# Patient Record
Sex: FEMALE | Race: OTHER | NOT HISPANIC OR LATINO | URBAN - METROPOLITAN AREA
[De-identification: names, ages, dates, MRNs, and addresses within clinical notes are randomized per-mention and may not be internally consistent; named-entity substitution may affect disease eponyms.]

---

## 2024-11-23 ENCOUNTER — HOSPITAL ENCOUNTER (EMERGENCY)
Facility: HOSPITAL | Age: 59
Discharge: HOME | End: 2024-11-23
Attending: EMERGENCY MEDICINE
Payer: COMMERCIAL

## 2024-11-23 VITALS
DIASTOLIC BLOOD PRESSURE: 103 MMHG | RESPIRATION RATE: 20 BRPM | SYSTOLIC BLOOD PRESSURE: 179 MMHG | BODY MASS INDEX: 29.02 KG/M2 | HEIGHT: 64 IN | OXYGEN SATURATION: 100 % | WEIGHT: 170 LBS | TEMPERATURE: 98 F | HEART RATE: 78 BPM

## 2024-11-23 DIAGNOSIS — M54.41 CHRONIC BILATERAL LOW BACK PAIN WITH RIGHT-SIDED SCIATICA: Primary | ICD-10-CM

## 2024-11-23 DIAGNOSIS — G89.29 CHRONIC BILATERAL LOW BACK PAIN WITH RIGHT-SIDED SCIATICA: Primary | ICD-10-CM

## 2024-11-23 LAB
ANION GAP SERPL CALC-SCNC: 8 MMOL/L (ref 8–16)
BACTERIA #/AREA URNS AUTO: ABNORMAL /HPF
BASOPHILS # BLD AUTO: 0.01 K/UL (ref 0–0.2)
BASOPHILS NFR BLD: 0.3 % (ref 0–1.9)
BILIRUB UR QL STRIP: NEGATIVE
BUN SERPL-MCNC: 11 MG/DL (ref 6–20)
CALCIUM SERPL-MCNC: 9.6 MG/DL (ref 8.7–10.5)
CHLORIDE SERPL-SCNC: 109 MMOL/L (ref 95–110)
CLARITY UR REFRACT.AUTO: CLEAR
CO2 SERPL-SCNC: 24 MMOL/L (ref 23–29)
COLOR UR AUTO: ABNORMAL
CREAT SERPL-MCNC: 0.8 MG/DL (ref 0.5–1.4)
DIFFERENTIAL METHOD BLD: ABNORMAL
EOSINOPHIL # BLD AUTO: 0.1 K/UL (ref 0–0.5)
EOSINOPHIL NFR BLD: 3 % (ref 0–8)
ERYTHROCYTE [DISTWIDTH] IN BLOOD BY AUTOMATED COUNT: 15.4 % (ref 11.5–14.5)
EST. GFR  (NO RACE VARIABLE): >60 ML/MIN/1.73 M^2
GLUCOSE SERPL-MCNC: 96 MG/DL (ref 70–110)
GLUCOSE UR QL STRIP: NEGATIVE
HCT VFR BLD AUTO: 40.5 % (ref 37–48.5)
HCV AB SERPL QL IA: NORMAL
HGB BLD-MCNC: 12.9 G/DL (ref 12–16)
HGB UR QL STRIP: NEGATIVE
HIV 1+2 AB+HIV1 P24 AG SERPL QL IA: NORMAL
IMM GRANULOCYTES # BLD AUTO: 0.01 K/UL (ref 0–0.04)
IMM GRANULOCYTES NFR BLD AUTO: 0.3 % (ref 0–0.5)
KETONES UR QL STRIP: NEGATIVE
LEUKOCYTE ESTERASE UR QL STRIP: NEGATIVE
LYMPHOCYTES # BLD AUTO: 1.7 K/UL (ref 1–4.8)
LYMPHOCYTES NFR BLD: 43.3 % (ref 18–48)
MCH RBC QN AUTO: 24.8 PG (ref 27–31)
MCHC RBC AUTO-ENTMCNC: 31.9 G/DL (ref 32–36)
MCV RBC AUTO: 78 FL (ref 82–98)
MICROSCOPIC COMMENT: ABNORMAL
MONOCYTES # BLD AUTO: 0.3 K/UL (ref 0.3–1)
MONOCYTES NFR BLD: 8.1 % (ref 4–15)
NEUTROPHILS # BLD AUTO: 1.8 K/UL (ref 1.8–7.7)
NEUTROPHILS NFR BLD: 45 % (ref 38–73)
NITRITE UR QL STRIP: POSITIVE
NRBC BLD-RTO: 0 /100 WBC
PH UR STRIP: 7 [PH] (ref 5–8)
PLATELET # BLD AUTO: 327 K/UL (ref 150–450)
PMV BLD AUTO: 9.7 FL (ref 9.2–12.9)
POTASSIUM SERPL-SCNC: 3.8 MMOL/L (ref 3.5–5.1)
PROT UR QL STRIP: NEGATIVE
RBC # BLD AUTO: 5.21 M/UL (ref 4–5.4)
RBC #/AREA URNS AUTO: 1 /HPF (ref 0–4)
SODIUM SERPL-SCNC: 141 MMOL/L (ref 136–145)
SP GR UR STRIP: 1.01 (ref 1–1.03)
SQUAMOUS #/AREA URNS AUTO: 0 /HPF
URN SPEC COLLECT METH UR: ABNORMAL
WBC # BLD AUTO: 3.95 K/UL (ref 3.9–12.7)
WBC #/AREA URNS AUTO: 1 /HPF (ref 0–5)

## 2024-11-23 PROCEDURE — 96374 THER/PROPH/DIAG INJ IV PUSH: CPT

## 2024-11-23 PROCEDURE — 86803 HEPATITIS C AB TEST: CPT | Performed by: PHYSICIAN ASSISTANT

## 2024-11-23 PROCEDURE — 80048 BASIC METABOLIC PNL TOTAL CA: CPT | Performed by: PHYSICIAN ASSISTANT

## 2024-11-23 PROCEDURE — 87389 HIV-1 AG W/HIV-1&-2 AB AG IA: CPT | Performed by: PHYSICIAN ASSISTANT

## 2024-11-23 PROCEDURE — 99284 EMERGENCY DEPT VISIT MOD MDM: CPT | Mod: 25

## 2024-11-23 PROCEDURE — 85025 COMPLETE CBC W/AUTO DIFF WBC: CPT | Performed by: PHYSICIAN ASSISTANT

## 2024-11-23 PROCEDURE — 81001 URINALYSIS AUTO W/SCOPE: CPT | Performed by: PHYSICIAN ASSISTANT

## 2024-11-23 PROCEDURE — 25000003 PHARM REV CODE 250: Performed by: PHYSICIAN ASSISTANT

## 2024-11-23 PROCEDURE — 63600175 PHARM REV CODE 636 W HCPCS: Performed by: PHYSICIAN ASSISTANT

## 2024-11-23 RX ORDER — METHOCARBAMOL 750 MG/1
1500 TABLET, FILM COATED ORAL
Status: COMPLETED | OUTPATIENT
Start: 2024-11-23 | End: 2024-11-23

## 2024-11-23 RX ORDER — IBUPROFEN 800 MG/1
800 TABLET ORAL EVERY 6 HOURS PRN
Qty: 20 TABLET | Refills: 0 | Status: SHIPPED | OUTPATIENT
Start: 2024-11-23

## 2024-11-23 RX ORDER — LIDOCAINE 50 MG/G
1 PATCH TOPICAL
Status: DISCONTINUED | OUTPATIENT
Start: 2024-11-23 | End: 2024-11-23 | Stop reason: HOSPADM

## 2024-11-23 RX ORDER — KETOROLAC TROMETHAMINE 30 MG/ML
10 INJECTION, SOLUTION INTRAMUSCULAR; INTRAVENOUS
Status: COMPLETED | OUTPATIENT
Start: 2024-11-23 | End: 2024-11-23

## 2024-11-23 RX ORDER — METHOCARBAMOL 750 MG/1
1500 TABLET, FILM COATED ORAL EVERY 8 HOURS PRN
Qty: 12 TABLET | Refills: 0 | Status: SHIPPED | OUTPATIENT
Start: 2024-11-23

## 2024-11-23 RX ORDER — AMLODIPINE BESYLATE 5 MG/1
5 TABLET ORAL DAILY
COMMUNITY

## 2024-11-23 RX ADMIN — LIDOCAINE 1 PATCH: 50 PATCH TOPICAL at 08:11

## 2024-11-23 RX ADMIN — KETOROLAC TROMETHAMINE 10 MG: 30 INJECTION, SOLUTION INTRAMUSCULAR; INTRAVENOUS at 08:11

## 2024-11-23 RX ADMIN — METHOCARBAMOL TABLETS 1500 MG: 750 TABLET, COATED ORAL at 08:11

## 2024-11-23 NOTE — ED NOTES
Patient identifiers verified and correct for Solitario BlackBrinson  LOC: The patient is awake, alert and aware of environment with an appropriate affect, the patient is oriented x 3 and speaking appropriately.   APPEARANCE: Patient appears uncomfortable but in no acute distress, patient is clean and well groomed.  SKIN: The skin is warm and dry, color consistent with ethnicity, patient has normal skin turgor and moist mucus membranes, skin intact, no breakdown or bruising noted.   MUSCULOSKELETAL: Patient moving all extremities spontaneously, no swelling noted. Pt reports lower back pain.  RESPIRATORY: Airway is open and patent, respirations are spontaneous, patient has a normal effort and rate, no accessory muscle use noted, O2 sats noted at 100% on room air.  CARDIAC: Denies chest pain HR 78  GASTRO: Denies abdominal pain nausea or emesis  : Pt denies any pain or frequency but does endorse strong odor with urination.  NEURO: AAOX4 Speech clear. Denies numbness or tingling to extremities

## 2024-11-23 NOTE — ED TRIAGE NOTES
"Pt reports bilateral lower back pain radiating to BLE for multiple months. Pt also reports strong odor from urine that's been "off and on for about a year"  "

## 2024-11-23 NOTE — DISCHARGE INSTRUCTIONS
Take ibuprofen 800 mg every 6 hours as needed with food for anti-inflammatory relief.  You can take acetaminophen/tylenol 650 mg every 6 hours or 1000 mg every 8 hours for added relief.  Take muscle relaxer Robaxin every 8 hours as needed for muscle spasms. Do not use medication when you are operating heavy machinery, driving, or working because the medication may be sedating. Do not take medication sooner than the frequency as directed.   Apply ice to the area for 10-20 minutes every 4 hours. You can apply heat 2 days after for the same duration and frequency.  Follow up with primary care, back and spine, and physical therapy. Referrals placed.  Return to the ER for new or worsening symptoms.

## 2024-11-23 NOTE — ED PROVIDER NOTES
Encounter Date: 11/23/2024       History     Chief Complaint   Patient presents with    Back Pain     Intermittent for 6 months Patient states experiencing muscle spasms. Patient also endorses foul smelling urine     7:58 AM  Patient is a 59-year-old female with a history of HTN who presents to [facility] ED for emergent evaluation of multiple complaints.    Patient endorses intermittent low back pain for about 6 months.  She states her back pain sometimes is localized to 1 side and sometimes it is bilateral.  This time in his located to her right low back.  She states in October she had an episode where her pain was so severe it caused her to have leg pain and weakness, but she currently does not have that that.  She denies any bowel or bladder incontinence, saddle anesthesias, or lower extremity numbness or tingling.    She also endorses intermittent malodorous urine for 1 year.  It started again on Tuesday.  Has not been treated for UTI in a long time.  She has frequency.  Denies any dysuria or hematuria.    Last had Tylenol yesterday p.m..  States her Tylenol stopped working which prompted her to come to the ED for her back pain.  Denies any previous surgeries to her back. No new injury or trauma.    Have an appt with her PCP on 12/9/2024.         Review of patient's allergies indicates:  No Known Allergies  Past Medical History:   Diagnosis Date    Hypertension      History reviewed. No pertinent surgical history.  No family history on file.  Social History     Tobacco Use    Smoking status: Never    Smokeless tobacco: Never   Substance Use Topics    Alcohol use: Not Currently    Drug use: Never     Review of Systems   Constitutional:  Positive for activity change. Negative for appetite change and fever.   HENT:  Negative for sore throat.    Respiratory:  Negative for shortness of breath.    Cardiovascular:  Negative for chest pain.   Gastrointestinal:  Negative for blood in stool, constipation, diarrhea, nausea and  vomiting.   Genitourinary:  Positive for frequency. Negative for difficulty urinating and dysuria.        +malodorous urine   Musculoskeletal:  Positive for back pain and myalgias.   Skin:  Negative for rash.   Neurological:  Negative for weakness and numbness.   Hematological:  Does not bruise/bleed easily.       Physical Exam     Initial Vitals [11/23/24 0732]   BP Pulse Resp Temp SpO2   (!) 179/103 78 20 97.8 °F (36.6 °C) 100 %      MAP       --         Physical Exam    Vitals reviewed.  Constitutional: She appears well-developed and well-nourished. She is not diaphoretic. She is cooperative.  Non-toxic appearance. She does not have a sickly appearance. She does not appear ill. No distress.   HENT:   Head: Normocephalic and atraumatic.   Nose: Nose normal. Mouth/Throat: No trismus in the jaw.   Eyes: Conjunctivae and EOM are normal.   Neck:   Normal range of motion.  Pulmonary/Chest: No accessory muscle usage. No tachypnea. No respiratory distress.   Abdominal: She exhibits no distension.   Musculoskeletal:         General: Normal range of motion.      Cervical back: Normal range of motion.     Neurological: She is alert. She has normal strength.   Reflex Scores:       Tricep reflexes are 2+ on the right side and 2+ on the left side.       Bicep reflexes are 2+ on the right side and 2+ on the left side.       Brachioradialis reflexes are 2+ on the right side and 2+ on the left side.       Patellar reflexes are 2+ on the right side and 2+ on the left side.       Achilles reflexes are 2+ on the right side and 2+ on the left side.  Full range of motion and 5/5 strength to bilateral upper and lower extremities.  Sensations grossly intact.  She can bear weight and ambulate independently, but favors to flex at the trunk secondary to pain if standing straight.   Skin: Skin is warm and dry. No erythema. No pallor.         ED Course   Procedures  Labs Reviewed   URINALYSIS, REFLEX TO URINE CULTURE - Abnormal       Result  Value    Specimen UA Urine, Clean Catch      Color, UA Straw      Appearance, UA Clear      pH, UA 7.0      Specific Gravity, UA 1.010      Protein, UA Negative      Glucose, UA Negative      Ketones, UA Negative      Bilirubin (UA) Negative      Occult Blood UA Negative      Nitrite, UA Positive (*)     Leukocytes, UA Negative      Narrative:     Specimen Source->Urine   CBC W/ AUTO DIFFERENTIAL - Abnormal    WBC 3.95      RBC 5.21      Hemoglobin 12.9      Hematocrit 40.5      MCV 78 (*)     MCH 24.8 (*)     MCHC 31.9 (*)     RDW 15.4 (*)     Platelets 327      MPV 9.7      Immature Granulocytes 0.3      Gran # (ANC) 1.8      Immature Grans (Abs) 0.01      Lymph # 1.7      Mono # 0.3      Eos # 0.1      Baso # 0.01      nRBC 0      Gran % 45.0      Lymph % 43.3      Mono % 8.1      Eosinophil % 3.0      Basophil % 0.3      Differential Method Automated      Narrative:     Release to patient->Immediate   URINALYSIS MICROSCOPIC - Abnormal    RBC, UA 1      WBC, UA 1      Bacteria Few (*)     Squam Epithel, UA 0      Microscopic Comment SEE COMMENT      Narrative:     Specimen Source->Urine   HEPATITIS C ANTIBODY    Hepatitis C Ab Non-reactive      Narrative:     Release to patient->Immediate   HIV 1 / 2 ANTIBODY    HIV 1/2 Ag/Ab Non-reactive      Narrative:     Release to patient->Immediate   BASIC METABOLIC PANEL    Sodium 141      Potassium 3.8      Chloride 109      CO2 24      Glucose 96      BUN 11      Creatinine 0.8      Calcium 9.6      Anion Gap 8      eGFR >60.0      Narrative:     Release to patient->Immediate          Imaging Results    None          Medications   LIDOcaine 5 % patch 1 patch (1 patch Transdermal Patch Applied 11/23/24 0820)   ketorolac injection 9.999 mg (9.999 mg Intravenous Given 11/23/24 0820)   methocarbamoL tablet 1,500 mg (1,500 mg Oral Given 11/23/24 0821)     Medical Decision Making  Differential diagnosis includes but isn't limited to strain, sprain, sacroiliitis, DDD, DJD,  other inflammatory arthritis.  No injury or trauma.  No bony tenderness.  No red flag symptoms.  No procedures.  No systemic symptoms.  Vitals are stable and she is nontoxic appearing.  Doubt spine fractures, dislocations, infection such as diskitis or epidural abscess, cauda equina, or spinal cord compression.    We can check labs since I have no records on her, check UA, give medication for symptomatic relief, and reassess.    Amount and/or Complexity of Data Reviewed  Labs: ordered.               ED Course as of 11/23/24 0935   Sat Nov 23, 2024   0758 BP(!): 179/103 [CL]   0758 Temp: 97.8 °F (36.6 °C) [CL]   0758 Pulse: 78 [CL]   0758 Resp: 20 [CL]   0758 SpO2: 100 % [CL]   0820 WBC: 3.95 [CL]   0820 Hemoglobin: 12.9 [CL]   0820 Platelet Count: 327 [CL]   0828 Sodium: 141 [CL]   0828 Potassium: 3.8 [CL]   0828 Glucose: 96 [CL]   0828 Creatinine: 0.8  No SCOTT. [CL]   0857 RBC, UA: 1 [CL]   0857 WBC, UA: 1 [CL]   0857 Bacteria, UA(!): Few  No signs of UTI. [CL]      ED Course User Index  [CL] Emily Ford PA-C            Patient reassessed.  She reports improvement in her pain while here.  Updated with labs and urinalysis results which do not show any acute findings.  I will treat her for musculoskeletal pain.  I think today she has sacroiliitis given that her pain is over her right SI joint, but it sounds like she likely has degenerative disc disease of lumbar region.  She has radicular pain today.  Will treat conservatively.  Continue NSAIDs, Tylenol.  Muscle relaxers for tension only as advised.  Follow up closely with back and spine for chronic pain.  I also would like her to follow up with physical therapy.  Referral placed.  All of her questions were answered.  Patient comfortable with plan and stable for discharge.                 Clinical Impression:  Final diagnoses:  [M54.41, G89.29] Chronic bilateral low back pain with right-sided sciatica (Primary)          ED Disposition Condition    Discharge Stable           ED Prescriptions       Medication Sig Dispense Start Date End Date Auth. Provider    methocarbamoL (ROBAXIN) 750 MG Tab Take 2 tablets (1,500 mg total) by mouth every 8 (eight) hours as needed. 12 tablet 11/23/2024 -- Emily Ford PA-C    ibuprofen (ADVIL,MOTRIN) 800 MG tablet Take 1 tablet (800 mg total) by mouth every 6 (six) hours as needed for Pain. 20 tablet 11/23/2024 -- Emily Ford PA-C          Follow-up Information       Follow up With Specialties Details Why Contact Info Additional Information    [facility] Schedule an appointment as soon as possible for a visit   [address], [address]  [facility] [address] [phone] Turn at Entrance [address] on [address] in [facility] and take elevators to Floor 2. Follow signs to [facility] [place] [employer]. Take [facility] [address] to Floor 4 for [address].    [facility] ([facility]) Physical Therapy Schedule an appointment as soon as possible for a visit   [address]  [facility] [address] [phone]     [facility] Emergency [facility] Emergency Medicine  If symptoms worsen [address] [address] [phone]              Emily Ford PA-C  11/23/24 0937